# Patient Record
Sex: FEMALE | Race: BLACK OR AFRICAN AMERICAN | NOT HISPANIC OR LATINO | URBAN - METROPOLITAN AREA
[De-identification: names, ages, dates, MRNs, and addresses within clinical notes are randomized per-mention and may not be internally consistent; named-entity substitution may affect disease eponyms.]

---

## 2017-03-28 ENCOUNTER — EMERGENCY (EMERGENCY)
Facility: HOSPITAL | Age: 42
LOS: 0 days | Discharge: HOME | End: 2017-03-28
Attending: EMERGENCY MEDICINE
Payer: MEDICAID

## 2017-03-28 VITALS
RESPIRATION RATE: 17 BRPM | HEIGHT: 63 IN | SYSTOLIC BLOOD PRESSURE: 153 MMHG | DIASTOLIC BLOOD PRESSURE: 98 MMHG | HEART RATE: 116 BPM | TEMPERATURE: 98 F | OXYGEN SATURATION: 100 % | WEIGHT: 132.94 LBS

## 2017-03-28 VITALS
DIASTOLIC BLOOD PRESSURE: 90 MMHG | HEART RATE: 100 BPM | RESPIRATION RATE: 18 BRPM | OXYGEN SATURATION: 99 % | SYSTOLIC BLOOD PRESSURE: 143 MMHG

## 2017-03-28 DIAGNOSIS — O20.0 THREATENED ABORTION: ICD-10-CM

## 2017-03-28 DIAGNOSIS — O36.4XX1 MATERNAL CARE FOR INTRAUTERINE DEATH, FETUS 1: ICD-10-CM

## 2017-03-28 DIAGNOSIS — N93.9 ABNORMAL UTERINE AND VAGINAL BLEEDING, UNSPECIFIED: ICD-10-CM

## 2017-03-28 DIAGNOSIS — Z3A.09 9 WEEKS GESTATION OF PREGNANCY: ICD-10-CM

## 2017-03-28 LAB
ALBUMIN SERPL ELPH-MCNC: 3.9 G/DL — SIGNIFICANT CHANGE UP (ref 3.3–5)
ALP SERPL-CCNC: 62 U/L — SIGNIFICANT CHANGE UP (ref 40–120)
ALT FLD-CCNC: 20 U/L — SIGNIFICANT CHANGE UP (ref 12–78)
ANION GAP SERPL CALC-SCNC: 13 MMOL/L — SIGNIFICANT CHANGE UP (ref 5–17)
ANISOCYTOSIS BLD QL: SIGNIFICANT CHANGE UP
APTT BLD: 23.3 SEC — LOW (ref 27.5–37.4)
AST SERPL-CCNC: 13 U/L — LOW (ref 15–37)
BILIRUB SERPL-MCNC: 0.3 MG/DL — SIGNIFICANT CHANGE UP (ref 0.2–1.2)
BLD GP AB SCN SERPL QL: SIGNIFICANT CHANGE UP
BUN SERPL-MCNC: 11 MG/DL — SIGNIFICANT CHANGE UP (ref 7–23)
CALCIUM SERPL-MCNC: 8.8 MG/DL — SIGNIFICANT CHANGE UP (ref 8.5–10.1)
CHLORIDE SERPL-SCNC: 103 MMOL/L — SIGNIFICANT CHANGE UP (ref 96–108)
CO2 SERPL-SCNC: 22 MMOL/L — SIGNIFICANT CHANGE UP (ref 22–31)
CREAT SERPL-MCNC: 0.65 MG/DL — SIGNIFICANT CHANGE UP (ref 0.5–1.3)
ELLIPTOCYTES BLD QL SMEAR: SLIGHT — SIGNIFICANT CHANGE UP
GLUCOSE SERPL-MCNC: 104 MG/DL — HIGH (ref 70–99)
HCG SERPL-ACNC: SIGNIFICANT CHANGE UP MIU/ML
HCT VFR BLD CALC: 30.2 % — LOW (ref 34.5–45)
HGB BLD-MCNC: 10.2 G/DL — LOW (ref 11.5–15.5)
HYPOCHROMIA BLD QL: SLIGHT — SIGNIFICANT CHANGE UP
INR BLD: 0.97 RATIO — SIGNIFICANT CHANGE UP (ref 0.88–1.16)
LIDOCAIN IGE QN: 158 U/L — SIGNIFICANT CHANGE UP (ref 73–393)
LYMPHOCYTES # BLD AUTO: 7 % — LOW (ref 13–44)
LYMPHOCYTES # SPEC AUTO: 2 % — HIGH (ref 0–0)
MCHC RBC-ENTMCNC: 29 PG — SIGNIFICANT CHANGE UP (ref 27–34)
MCHC RBC-ENTMCNC: 33.7 GM/DL — SIGNIFICANT CHANGE UP (ref 32–36)
MCV RBC AUTO: 86.2 FL — SIGNIFICANT CHANGE UP (ref 80–100)
MICROCYTES BLD QL: SLIGHT — SIGNIFICANT CHANGE UP
MONOCYTES NFR BLD AUTO: 2 % — SIGNIFICANT CHANGE UP (ref 2–14)
NEUTROPHILS NFR BLD AUTO: 88 % — HIGH (ref 43–77)
NEUTS VAC BLD QL SMEAR: SLIGHT — SIGNIFICANT CHANGE UP
PLAT MORPH BLD: NORMAL — SIGNIFICANT CHANGE UP
PLATELET # BLD AUTO: 192 K/UL — SIGNIFICANT CHANGE UP (ref 150–400)
PLATELET COUNT - ESTIMATE: ADEQUATE — SIGNIFICANT CHANGE UP
POLYCHROMASIA BLD QL SMEAR: SLIGHT — SIGNIFICANT CHANGE UP
POTASSIUM SERPL-MCNC: 3.5 MMOL/L — SIGNIFICANT CHANGE UP (ref 3.5–5.3)
POTASSIUM SERPL-SCNC: 3.5 MMOL/L — SIGNIFICANT CHANGE UP (ref 3.5–5.3)
PROT SERPL-MCNC: 8.2 GM/DL — SIGNIFICANT CHANGE UP (ref 6–8.3)
PROTHROM AB SERPL-ACNC: 10.6 SEC — SIGNIFICANT CHANGE UP (ref 9.8–12.7)
RBC # BLD: 3.51 M/UL — LOW (ref 3.8–5.2)
RBC # FLD: 18.2 % — HIGH (ref 11–15)
RBC BLD AUTO: ABNORMAL
SCHISTOCYTES BLD QL AUTO: SLIGHT — SIGNIFICANT CHANGE UP
SODIUM SERPL-SCNC: 138 MMOL/L — SIGNIFICANT CHANGE UP (ref 135–145)
VARIANT LYMPHS # BLD: 1 % — SIGNIFICANT CHANGE UP (ref 0–6)
WBC # BLD: 6.9 K/UL — SIGNIFICANT CHANGE UP (ref 3.8–10.5)
WBC # FLD AUTO: 6.9 K/UL — SIGNIFICANT CHANGE UP (ref 3.8–10.5)

## 2017-03-28 PROCEDURE — 76856 US EXAM PELVIC COMPLETE: CPT | Mod: 26

## 2017-03-28 PROCEDURE — 99285 EMERGENCY DEPT VISIT HI MDM: CPT

## 2017-03-28 NOTE — ED ADULT NURSE NOTE - OBJECTIVE STATEMENT
received pt lying c/o spotting yesterday denies any pain, nausea and vomting, went to the clinic, sent here for eval

## 2017-03-28 NOTE — ED ADULT TRIAGE NOTE - CHIEF COMPLAINT QUOTE
spotting x 2 days w/ positive pregnancy test LMP January 21st, sent in by general practitioner for evaluation, had spotting in february, first pregnancy, pt denies any abdominal pain/ cramping

## 2017-03-28 NOTE — ED PROVIDER NOTE - CARE PLAN
Principal Discharge DX:	Fetal death affecting management of mother, not applicable or unspecified fetus

## 2017-03-28 NOTE — ED PROVIDER NOTE - OBJECTIVE STATEMENT
Pt is a 42 yo lady with no significant past medical history who presents to the ED with vaginal spotting, and sent in by PMD because found to have positive UPT today. LMP . No abdominal pain, no n/v/d, no fevers, no chest pain, no sob. No dysuria.  with this pregnancy.

## 2017-04-05 ENCOUNTER — OUTPATIENT (OUTPATIENT)
Dept: OUTPATIENT SERVICES | Facility: HOSPITAL | Age: 42
LOS: 1 days | Discharge: HOME | End: 2017-04-05

## 2017-04-05 VITALS
HEART RATE: 92 BPM | TEMPERATURE: 98 F | DIASTOLIC BLOOD PRESSURE: 74 MMHG | SYSTOLIC BLOOD PRESSURE: 134 MMHG | OXYGEN SATURATION: 100 % | RESPIRATION RATE: 16 BRPM | WEIGHT: 127.21 LBS | HEIGHT: 63 IN

## 2017-04-05 DIAGNOSIS — Z01.818 ENCOUNTER FOR OTHER PREPROCEDURAL EXAMINATION: ICD-10-CM

## 2017-04-05 DIAGNOSIS — Z98.890 OTHER SPECIFIED POSTPROCEDURAL STATES: Chronic | ICD-10-CM

## 2017-04-05 DIAGNOSIS — O02.1 MISSED ABORTION: ICD-10-CM

## 2017-04-05 LAB
ANISOCYTOSIS BLD QL: SLIGHT — SIGNIFICANT CHANGE UP
APPEARANCE UR: ABNORMAL
APTT BLD: 23.5 SEC — LOW (ref 27.5–37.4)
BACTERIA # UR AUTO: ABNORMAL
BILIRUB UR-MCNC: NEGATIVE — SIGNIFICANT CHANGE UP
COLOR SPEC: YELLOW — SIGNIFICANT CHANGE UP
DIFF PNL FLD: ABNORMAL
ELLIPTOCYTES BLD QL SMEAR: SLIGHT — SIGNIFICANT CHANGE UP
EPI CELLS # UR: ABNORMAL
GLUCOSE UR QL: NEGATIVE MG/DL — SIGNIFICANT CHANGE UP
HCG SERPL-ACNC: SIGNIFICANT CHANGE UP MIU/ML
HCT VFR BLD CALC: 29.7 % — LOW (ref 34.5–45)
HGB BLD-MCNC: 9.8 G/DL — LOW (ref 11.5–15.5)
HYPOCHROMIA BLD QL: SLIGHT — SIGNIFICANT CHANGE UP
INR BLD: 1 RATIO — SIGNIFICANT CHANGE UP (ref 0.88–1.16)
KETONES UR-MCNC: ABNORMAL
LEUKOCYTE ESTERASE UR-ACNC: NEGATIVE — SIGNIFICANT CHANGE UP
LYMPHOCYTES # BLD AUTO: 24 % — SIGNIFICANT CHANGE UP (ref 13–44)
MCHC RBC-ENTMCNC: 28.8 PG — SIGNIFICANT CHANGE UP (ref 27–34)
MCHC RBC-ENTMCNC: 33.1 GM/DL — SIGNIFICANT CHANGE UP (ref 32–36)
MCV RBC AUTO: 86.8 FL — SIGNIFICANT CHANGE UP (ref 80–100)
MICROCYTES BLD QL: SLIGHT — SIGNIFICANT CHANGE UP
MONOCYTES NFR BLD AUTO: 6 % — SIGNIFICANT CHANGE UP (ref 2–14)
NEUTROPHILS NFR BLD AUTO: 70 % — SIGNIFICANT CHANGE UP (ref 43–77)
NEUTS VAC BLD QL SMEAR: SLIGHT — SIGNIFICANT CHANGE UP
NITRITE UR-MCNC: NEGATIVE — SIGNIFICANT CHANGE UP
PH UR: 6 — SIGNIFICANT CHANGE UP (ref 4.8–8)
PLAT MORPH BLD: NORMAL — SIGNIFICANT CHANGE UP
PLATELET # BLD AUTO: 177 K/UL — SIGNIFICANT CHANGE UP (ref 150–400)
POLYCHROMASIA BLD QL SMEAR: SLIGHT — SIGNIFICANT CHANGE UP
PROT UR-MCNC: 30 MG/DL
PROTHROM AB SERPL-ACNC: 10.9 SEC — SIGNIFICANT CHANGE UP (ref 9.8–12.7)
RBC # BLD: 3.42 M/UL — LOW (ref 3.8–5.2)
RBC # FLD: 17 % — HIGH (ref 11–15)
RBC BLD AUTO: ABNORMAL
RBC CASTS # UR COMP ASSIST: SIGNIFICANT CHANGE UP /HPF (ref 0–4)
SCHISTOCYTES BLD QL AUTO: SLIGHT — SIGNIFICANT CHANGE UP
SP GR SPEC: 1.02 — SIGNIFICANT CHANGE UP (ref 1.01–1.02)
UROBILINOGEN FLD QL: NEGATIVE MG/DL — SIGNIFICANT CHANGE UP
WBC # BLD: 5.9 K/UL — SIGNIFICANT CHANGE UP (ref 3.8–10.5)
WBC # FLD AUTO: 5.9 K/UL — SIGNIFICANT CHANGE UP (ref 3.8–10.5)

## 2017-04-05 RX ORDER — SODIUM CHLORIDE 9 MG/ML
3 INJECTION INTRAMUSCULAR; INTRAVENOUS; SUBCUTANEOUS EVERY 8 HOURS
Qty: 0 | Refills: 0 | Status: DISCONTINUED | OUTPATIENT
Start: 2017-04-06 | End: 2017-04-21

## 2017-04-05 NOTE — H&P PST ADULT - NSANTHOSAYNRD_GEN_A_CORE
No. SHANTELL screening performed.  STOP BANG Legend: 0-2 = LOW Risk; 3-4 = INTERMEDIATE Risk; 5-8 = HIGH Risk

## 2017-04-06 ENCOUNTER — OUTPATIENT (OUTPATIENT)
Dept: OUTPATIENT SERVICES | Facility: HOSPITAL | Age: 42
LOS: 1 days | Discharge: HOME | End: 2017-04-06
Payer: MEDICAID

## 2017-04-06 VITALS
DIASTOLIC BLOOD PRESSURE: 82 MMHG | OXYGEN SATURATION: 100 % | RESPIRATION RATE: 21 BRPM | SYSTOLIC BLOOD PRESSURE: 135 MMHG | HEART RATE: 88 BPM | TEMPERATURE: 98 F

## 2017-04-06 VITALS
RESPIRATION RATE: 16 BRPM | OXYGEN SATURATION: 100 % | HEART RATE: 94 BPM | SYSTOLIC BLOOD PRESSURE: 134 MMHG | DIASTOLIC BLOOD PRESSURE: 76 MMHG

## 2017-04-06 VITALS
DIASTOLIC BLOOD PRESSURE: 82 MMHG | SYSTOLIC BLOOD PRESSURE: 133 MMHG | WEIGHT: 119.93 LBS | TEMPERATURE: 98 F | HEART RATE: 100 BPM | RESPIRATION RATE: 18 BRPM | HEIGHT: 63 IN | OXYGEN SATURATION: 100 %

## 2017-04-06 DIAGNOSIS — Z98.890 OTHER SPECIFIED POSTPROCEDURAL STATES: Chronic | ICD-10-CM

## 2017-04-06 LAB — BLD GP AB SCN SERPL QL: SIGNIFICANT CHANGE UP

## 2017-04-06 PROCEDURE — 88305 TISSUE EXAM BY PATHOLOGIST: CPT | Mod: 26

## 2017-04-06 RX ORDER — MORPHINE SULFATE 50 MG/1
4 CAPSULE, EXTENDED RELEASE ORAL
Qty: 0 | Refills: 0 | Status: DISCONTINUED | OUTPATIENT
Start: 2017-04-06 | End: 2017-04-06

## 2017-04-06 RX ORDER — ACETAMINOPHEN 500 MG
1000 TABLET ORAL ONCE
Qty: 0 | Refills: 0 | Status: COMPLETED | OUTPATIENT
Start: 2017-04-06 | End: 2017-04-06

## 2017-04-06 RX ORDER — SODIUM CHLORIDE 9 MG/ML
1000 INJECTION, SOLUTION INTRAVENOUS
Qty: 0 | Refills: 0 | Status: DISCONTINUED | OUTPATIENT
Start: 2017-04-06 | End: 2017-04-21

## 2017-04-06 RX ORDER — SODIUM CHLORIDE 9 MG/ML
1000 INJECTION, SOLUTION INTRAVENOUS
Qty: 0 | Refills: 0 | Status: DISCONTINUED | OUTPATIENT
Start: 2017-04-06 | End: 2017-04-06

## 2017-04-06 RX ORDER — ONDANSETRON 8 MG/1
4 TABLET, FILM COATED ORAL ONCE
Qty: 0 | Refills: 0 | Status: DISCONTINUED | OUTPATIENT
Start: 2017-04-06 | End: 2017-04-06

## 2017-04-06 RX ORDER — ONDANSETRON 8 MG/1
4 TABLET, FILM COATED ORAL ONCE
Qty: 0 | Refills: 0 | Status: DISCONTINUED | OUTPATIENT
Start: 2017-04-06 | End: 2017-04-21

## 2017-04-06 RX ORDER — IBUPROFEN 200 MG
1 TABLET ORAL
Qty: 20 | Refills: 0 | OUTPATIENT
Start: 2017-04-06

## 2017-04-06 RX ORDER — ACETAMINOPHEN 500 MG
1000 TABLET ORAL ONCE
Qty: 0 | Refills: 0 | Status: DISCONTINUED | OUTPATIENT
Start: 2017-04-06 | End: 2017-04-21

## 2017-04-06 RX ORDER — IBUPROFEN 200 MG
1 TABLET ORAL
Qty: 20 | Refills: 0
Start: 2017-04-06

## 2017-04-06 NOTE — BRIEF OPERATIVE NOTE - POST-OP DX
Fibroid uterus  2017    Active  Joseph Jack  Missed  with fetal demise before 20 completed weeks of gestation  2017    Active  Joseph Jack

## 2017-04-06 NOTE — ASU DISCHARGE PLAN (ADULT/PEDIATRIC). - SPECIAL INSTRUCTIONS
Please call doctor's office for a follow up appointment to be seen in 7-10 days Please call doctor's office for a follow up appointment.

## 2017-04-06 NOTE — ASU DISCHARGE PLAN (ADULT/PEDIATRIC). - MEDICATION SUMMARY - MEDICATIONS TO TAKE
I will START or STAY ON the medications listed below when I get home from the hospital:     mg oral tablet  -- 1 tab(s) by mouth every 6 hours, As Needed -for mild pain MDD:4  take with food.  -- Do not take this drug if you are pregnant.  It is very important that you take or use this exactly as directed.  Do not skip doses or discontinue unless directed by your doctor.  May cause drowsiness or dizziness.  Obtain medical advice before taking any non-prescription drugs as some may affect the action of this medication.  Take with food or milk.    -- Indication: For for mild pain I will START or STAY ON the medications listed below when I get home from the hospital:     mg oral tablet  -- 1 tab(s) by mouth every 6 hours, As Needed -for mild pain MDD:4  take with food.  -- Do not take this drug if you are pregnant.  It is very important that you take or use this exactly as directed.  Do not skip doses or discontinue unless directed by your doctor.  May cause drowsiness or dizziness.  Obtain medical advice before taking any non-prescription drugs as some may affect the action of this medication.  Take with food or milk.    -- Indication: For for mild pain    Augmentin 875 mg-125 mg oral tablet  -- 1 tab(s) by mouth every 12 hours MDD:2  -- Finish all this medication unless otherwise directed by prescriber.  Take with food or milk.    -- Indication: For antibiotic    Methergine 0.2 mg oral tablet  -- 1 tab(s) by mouth 3 times a day MDD:3  -- It is very important that you take or use this exactly as directed.  Do not skip doses or discontinue unless directed by your doctor.    -- Indication: For to help uterus to contract

## 2017-04-06 NOTE — ASU DISCHARGE PLAN (ADULT/PEDIATRIC). - NOTIFY
Numbness, tingling/Inability to Tolerate Liquids or Foods/Pain not relieved by Medications/GYN Fever>100.4/Increased Irritability or Sluggishness/Excessive Diarrhea/Unable to Urinate/Bleeding that does not stop

## 2017-04-06 NOTE — ASU DISCHARGE PLAN (ADULT/PEDIATRIC). - ACTIVITY LEVEL
no heavy lifting/no sports/gym/no tub baths/no exercise no tampons/no intercourse/no tub baths/no sports/gym/no exercise/no douching/nothing per vagina/no heavy lifting

## 2017-04-10 LAB — SURGICAL PATHOLOGY FINAL REPORT - CH: SIGNIFICANT CHANGE UP

## 2017-04-11 DIAGNOSIS — O02.1 MISSED ABORTION: ICD-10-CM

## 2019-10-09 NOTE — ED ADULT TRIAGE NOTE - CCCP TRG CHIEF CMPLNT
Visit for Vaccine    Patient presents for shingrix #2 and flu vaccine    Current Outpatient Medications   Medication Sig Dispense Refill   • pioglitazone (ACTOS) 30 MG tablet TAKE 1 TABLET DAILY 90 tablet 3   • PARoxetine (PAXIL) 40 MG tablet TAKE 1 TABLET DAILY 90 tablet 3   • LORazepam (ATIVAN) 0.5 MG tablet Take 1 tablet by mouth 3 times daily. 90 tablet 0   • pravastatin (PRAVACHOL) 10 MG tablet TAKE 1 TABLET BY MOUTH EVERY DAY 90 tablet 1   • aspirin 81 MG tablet Take 81 mg by mouth daily.     • armodafinil 150 MG tablet Take 1 tablet by mouth daily. 90 tablet 0   • FARXIGA 10 MG tablet TAKE 1 TABLET BY MOUTH EVERY DAY IN THE MORNING 30 tablet 5   • B-D U/F PEN NEEDLE 31G X 5 MM Misc INJECT ONCE A  each 3   • tadalafil (CIALIS) 20 MG tablet TAKE 1 TABLET BY MOUTH 1 HOUR BEFORE NEEDED     • BASAGLAR KWIKPEN 100 UNIT/ML pen-injector INJECT 35 UNITS UNDER THE SKIN DAILY AS DIRECTED 45 mL 3   • metformin (GLUCOPHAGE) 1000 MG tablet TAKE 1 TABLET TWICE DAILY WITH MEALS 180 tablet 3   • azelastine (ASTELIN) 0.1 % nasal spray USE 1 TO 2 SPRAYS IN EACH NOSTRIL TWICE DAILY AS NEEDED 30 mL 5     No current facility-administered medications for this visit.      ALLERGIES:  No Known Allergies    Screening Checklist for Vaccines     1. Are you sick today? No  2. Do you have allergies to medications, food, a vaccine component, or latex? No  3. Have you ever had a serious reaction after receiving a vaccination? No  4. Do you have a long-term health problem with heart disease, lung disease, asthma, kidney disease, metabolic disease (e.g.diabetes), anemia, or other blood disorder? Yes  5. Do you have cancer, leukemia, HIV/AIDs, or any other immune system problem? No  6. In the past 3 months, have you taken medications that affect your immune system, such as prednisone, other steroids, or anticancer drugs; drugs for the treatment of rheumatoid arthritis, Crohn's disease, or psoriasis; or have you had radiation  treatments? No  7. Have you had a seizure or a brain or other nervous system problem? No  8. During the past year, have you received a transfusion of blood or blood products, or been given immune (gamma) globulin or an antiviral drug? No  9. For women: Are you pregnant or is there a chance you could become pregnant during the next month? No  10. Have you received any vaccinations in the past 4 weeks? No      Reviewed potential contraindications    Screening questions and answers reviewed by the provider      Patient was observed after administration and no side effects were noted. Patient discharged to home. [facility] VIS was given to patient. Risks and benefits of vaccine was discussed and questions answered. Patient verbalized understanding and signed consent for vaccine.     Assessment      Plan  Immunization History   Administered Date(s) Administered   • Influenza, injectable, quadrivalent 10/01/2017   • Influenza, injectable, quadrivalent, preservative-free 09/20/2016, 10/31/2018, 10/09/2019   • Influenza, seasonal, injectable, preservative free 10/10/2014, 09/16/2015   • Influenza, seasonal, injectable, trivalent 10/03/2012, 09/30/2013, 09/16/2015, 11/01/2018   • Shingles Zoster (Shingrix) 07/12/2019, 10/09/2019   • Tdap 07/12/2019         Patient Instructions   may take Tylenol or apply ice for discomfort  Usually takes up to 2 weeks to develop immunity.        TONY Joseph      pregnancy problem

## 2022-03-11 ENCOUNTER — OUTPATIENT (OUTPATIENT)
Dept: OUTPATIENT SERVICES | Facility: HOSPITAL | Age: 47
LOS: 1 days | End: 2022-03-11

## 2022-03-11 ENCOUNTER — OUTPATIENT (OUTPATIENT)
Dept: OUTPATIENT SERVICES | Facility: HOSPITAL | Age: 47
LOS: 1 days | End: 2022-03-11
Payer: COMMERCIAL

## 2022-03-11 VITALS
DIASTOLIC BLOOD PRESSURE: 72 MMHG | RESPIRATION RATE: 16 BRPM | SYSTOLIC BLOOD PRESSURE: 133 MMHG | HEART RATE: 87 BPM | HEIGHT: 63 IN | WEIGHT: 115.08 LBS | OXYGEN SATURATION: 100 % | TEMPERATURE: 98 F

## 2022-03-11 DIAGNOSIS — Z01.818 ENCOUNTER FOR OTHER PREPROCEDURAL EXAMINATION: ICD-10-CM

## 2022-03-11 DIAGNOSIS — Z98.890 OTHER SPECIFIED POSTPROCEDURAL STATES: Chronic | ICD-10-CM

## 2022-03-11 DIAGNOSIS — R10.11 RIGHT UPPER QUADRANT PAIN: ICD-10-CM

## 2022-03-11 DIAGNOSIS — D05.00 LOBULAR CARCINOMA IN SITU OF UNSPECIFIED BREAST: ICD-10-CM

## 2022-03-11 PROCEDURE — G0463: CPT

## 2022-03-11 RX ORDER — SODIUM CHLORIDE 9 MG/ML
3 INJECTION INTRAMUSCULAR; INTRAVENOUS; SUBCUTANEOUS EVERY 8 HOURS
Refills: 0 | Status: DISCONTINUED | OUTPATIENT
Start: 2022-03-16 | End: 2022-03-23

## 2022-03-11 NOTE — H&P PST ADULT - NSICDXFAMILYHX_GEN_ALL_CORE_FT
FAMILY HISTORY:  Sibling  Still living? Yes, Estimated age: Age Unknown  FHx: hypertension, Age at diagnosis: Age Unknown

## 2022-03-11 NOTE — H&P PST ADULT - RS GEN PE MLT RESP DETAILS PC
normal/airway patent/breath sounds equal/good air movement/respirations non-labored/clear to auscultation bilaterally/no chest wall tenderness/no intercostal retractions/no rhonchi/no subcutaneous emphysema/no wheezes

## 2022-03-11 NOTE — H&P PST ADULT - ASSESSMENT
45 y/o female with  PMHof COVID 19 viral infection 12/2021, now fully recovered is diagnosed with lobular carcinoma in situ of breast   Patient's STOP BANG Score is 0

## 2022-03-11 NOTE — H&P PST ADULT - HISTORY OF PRESENT ILLNESS
45 y/o female with no significant PMH presents to [facility] for presurgical evaluation. She she had a sharp pain on and off in right nipple x 1 month. She adds that biopsy of right breast malignancy in right breast. She is diagnosed with lobular carcinoma in situ of breast and is scheduled for right breast lumpectomy with needle localization 3/16/2022

## 2022-03-11 NOTE — H&P PST ADULT - PROBLEM SELECTOR PLAN 1
Scheduled for right breast lumpectomy with needle localization 3/16/2022. Scheduled for right breast lumpectomy with needle localization 3/16/2022.  Preoperative instructions discussed and given to patient.   Instructed to call [phone] to schedule COVID 19 test 3 days prior to surgery.   Discussed preprocedure skin preparation using chlorhexidine gluconate 4% solution three days prior to  surgery.  Instructed patient to avoid aspirin and aspirin products, over the counter medications such as vitamins and herbal medications, one week prior to surgery.   Take tylenol as needed for pain  Patient verbalized understanding of instructions and is in agreement with plan of care.

## 2022-03-16 ENCOUNTER — OUTPATIENT (OUTPATIENT)
Dept: OUTPATIENT SERVICES | Facility: HOSPITAL | Age: 47
LOS: 1 days | End: 2022-03-16
Payer: COMMERCIAL

## 2022-03-16 VITALS
HEIGHT: 63 IN | RESPIRATION RATE: 18 BRPM | DIASTOLIC BLOOD PRESSURE: 88 MMHG | HEART RATE: 99 BPM | SYSTOLIC BLOOD PRESSURE: 157 MMHG | TEMPERATURE: 98 F | WEIGHT: 115.08 LBS | OXYGEN SATURATION: 100 %

## 2022-03-16 VITALS
RESPIRATION RATE: 18 BRPM | TEMPERATURE: 98 F | HEART RATE: 79 BPM | SYSTOLIC BLOOD PRESSURE: 147 MMHG | DIASTOLIC BLOOD PRESSURE: 78 MMHG | OXYGEN SATURATION: 100 %

## 2022-03-16 DIAGNOSIS — Z98.890 OTHER SPECIFIED POSTPROCEDURAL STATES: Chronic | ICD-10-CM

## 2022-03-16 DIAGNOSIS — D05.00 LOBULAR CARCINOMA IN SITU OF UNSPECIFIED BREAST: ICD-10-CM

## 2022-03-16 DIAGNOSIS — C50.911 MALIGNANT NEOPLASM OF UNSPECIFIED SITE OF RIGHT FEMALE BREAST: ICD-10-CM

## 2022-03-16 LAB — HCG UR QL: NEGATIVE — SIGNIFICANT CHANGE UP

## 2022-03-16 PROCEDURE — 76098 X-RAY EXAM SURGICAL SPECIMEN: CPT | Mod: 26

## 2022-03-16 PROCEDURE — 19281 PERQ DEVICE BREAST 1ST IMAG: CPT | Mod: RT

## 2022-03-16 PROCEDURE — 19125 EXCISION BREAST LESION: CPT | Mod: RT

## 2022-03-16 PROCEDURE — 88305 TISSUE EXAM BY PATHOLOGIST: CPT

## 2022-03-16 PROCEDURE — 76098 X-RAY EXAM SURGICAL SPECIMEN: CPT

## 2022-03-16 PROCEDURE — 88305 TISSUE EXAM BY PATHOLOGIST: CPT | Mod: 26

## 2022-03-16 PROCEDURE — 81025 URINE PREGNANCY TEST: CPT

## 2022-03-16 PROCEDURE — 19281 PERQ DEVICE BREAST 1ST IMAG: CPT

## 2022-03-16 DEVICE — CLIP APPLIER COVIDIEN SURGICLIP II 9.75" MEDIUM: Type: IMPLANTABLE DEVICE | Site: RIGHT | Status: FUNCTIONAL

## 2022-03-16 DEVICE — CLIP APPLIER COVIDIEN SURGICLIP 13" LARGE: Type: IMPLANTABLE DEVICE | Site: RIGHT | Status: FUNCTIONAL

## 2022-03-16 RX ADMIN — SODIUM CHLORIDE 3 MILLILITER(S): 9 INJECTION INTRAMUSCULAR; INTRAVENOUS; SUBCUTANEOUS at 11:20

## 2022-03-16 NOTE — ASU DISCHARGE PLAN (ADULT/PEDIATRIC) - PROVIDER TOKENS
PROVIDER:[TOKEN:[[id number]],FOLLOWUP:[2 weeks],ESTABLISHEDPATIENT:[T]] PROVIDER:[TOKEN:[[id number]],FOLLOWUP:[1 week],ESTABLISHEDPATIENT:[T]]

## 2022-03-16 NOTE — ASU DISCHARGE PLAN (ADULT/PEDIATRIC) - NS MD DC FALL RISK RISK
For information on Fall & Injury Prevention, visit: [email or web address] OR  [email or web address] OR  https://www.cdc.gov/steadi/patient.html

## 2022-03-16 NOTE — ASU DISCHARGE PLAN (ADULT/PEDIATRIC) - ASU DC SPECIAL INSTRUCTIONSFT
Alternate Tylenol 650mg every 6 hours and Motrin 600mg every 6 hours for mild to moderate pain.    Avoid lifting heavy weights and raising your left arm above your head.    You may remove the clear dressing and gauze on Thursday afternoon, but leave the paper strips in place as they will fall off on their own. You may begin showering at this time. Allow warm soapy water to run over wound, and pat dry. Do not rub wound, and no soaking.    Follow-up in clinic in two weeks.

## 2022-03-16 NOTE — ASU DISCHARGE PLAN (ADULT/PEDIATRIC) - CARE PROVIDER_API CALL
Paul Nuñez)  ColonRectal Surgery; [place] [address]  Phone: [phone]  Fax: [phone]  Established Patient  Follow Up Time: 2 weeks   Paul Nuñez)  ColonRectal Surgery; [place] [address]  Phone: [phone]  Fax: [phone]  Established Patient  Follow Up Time: 1 week

## 2022-03-16 NOTE — ASU PATIENT PROFILE, ADULT - VISION (WITH CORRECTIVE LENSES IF THE PATIENT USUALLY WEARS THEM):
Partially impaired: cannot see medication labels or newsprint, but can see obstacles in path, and the surrounding layout; can count fingers at arm's length reading and distance/Partially impaired: cannot see medication labels or newsprint, but can see obstacles in path, and the surrounding layout; can count fingers at arm's length

## 2022-03-16 NOTE — ASU PREOP CHECKLIST - SELECT TESTS ORDERED
Results in MD note/COVID-19 UCG sent/BMP/CBC/PT/PTT/INR/UCG/EKG/CXR/Results in MD note/COVID-19 UCG sent/negative/BMP/CBC/PT/PTT/INR/UCG/EKG/CXR/Results in MD note/COVID-19

## 2022-03-16 NOTE — ASU PATIENT PROFILE, ADULT - MEDICATIONS BROUGHT TO HOSPITAL, PROFILE
----- Message from Heather Lundy PA-C sent at 1/30/2019 10:24 AM CST -----  Please inform - pelvic ultrasound shows no abnormalities. no

## 2022-03-16 NOTE — BRIEF OPERATIVE NOTE - NSICDXBRIEFPROCEDURE_GEN_ALL_CORE_FT
PROCEDURES:  Lumpectomy of right breast with needle localization 16-Mar-2022 17:19:31  Migel Gordon

## 2022-03-22 LAB — SURGICAL PATHOLOGY STUDY: SIGNIFICANT CHANGE UP

## 2022-09-13 NOTE — ED PROVIDER NOTE - CROS ED CARDIOVAS ALL NEG
negative... Tranexamic Acid Pregnancy And Lactation Text: It is unknown if this medication is safe during pregnancy or breast feeding.

## 2024-03-06 NOTE — ASU PREOP CHECKLIST - WARM FLUIDS/WARM BLANKETS
"History and Physical -  [facility]  Jeaneth Koenig 32 y.o. female MRN: [id number]    HPI: Jeaneth Koenig is a 32 y.o. year old female who presents show GERD.      Review of Systems    Historical Information   Past Medical History:   Diagnosis Date    Atopic dermatitis     Resolved: [id number]    Bunion of great toe of right foot     Last Assessed: [id number]    Chronic sinusitis     Resolved: [id number]    Herpes labialis     Resolved: [id number]    Kidney stone     Ovarian cyst     Peptic ulcer     PONV (postoperative nausea and vomiting)      Past Surgical History:   Procedure Laterality Date     SECTION  2015     SECTION  10/20/2017    EGD      SINUS SURGERY      WISDOM TOOTH EXTRACTION       Social History   Social History     Substance and Sexual Activity   Alcohol Use Yes    Comment: occasional     Social History     Substance and Sexual Activity   Drug Use No     Social History     Tobacco Use   Smoking Status Never   Smokeless Tobacco Never     Family History   Problem Relation Age of Onset    Asthma Mother     Hyperlipidemia Father     Hypertension Father     Stroke Maternal Grandmother     Hypertension Maternal Grandmother     Breast cancer Paternal Grandmother     Breast cancer Paternal Aunt     Substance Abuse Neg Hx     Mental illness Neg Hx        Meds/Allergies     (Not in a hospital admission)      Allergies   Allergen Reactions    Elavil [Amitriptyline] Drowsiness    Prednisone GI Intolerance    Tramadol Itching    Vicodin [Hydrocodone-Acetaminophen] Itching    Latex Hives       Objective     /72   Pulse 71   Temp 99.4 °F (37.4 °C) (Tympanic)   Resp 16   Ht 5' 5\" (1.651 m)   Wt 91.2 kg (201 lb)   SpO2 100%   BMI 33.45 kg/m²       PHYSICAL EXAM    Gen: NAD  CV: RRR  CHEST: Clear  ABD: soft, NT/ND  EXT: no edema  Neuro: AAO      ASSESSMENT/PLAN:  This is a 32 y.o. year old female here for evaluation of GERD.    PLAN:   Procedure: EGD.      " no

## 2025-02-24 NOTE — H&P PST ADULT - NO PERTINENT FAMILY HISTORY
Patient has been scheduled on March 17th at 10:50 with a 9:20 arrival.   <<----- Click to add NO pertinent Family History
